# Patient Record
Sex: FEMALE | Race: WHITE | NOT HISPANIC OR LATINO | Employment: FULL TIME | URBAN - NONMETROPOLITAN AREA
[De-identification: names, ages, dates, MRNs, and addresses within clinical notes are randomized per-mention and may not be internally consistent; named-entity substitution may affect disease eponyms.]

---

## 2023-06-24 ENCOUNTER — TELEPHONE (OUTPATIENT)
Dept: PEDIATRICS | Facility: CLINIC | Age: 23
End: 2023-06-24

## 2023-06-24 ENCOUNTER — OFFICE VISIT (OUTPATIENT)
Dept: PEDIATRICS | Facility: CLINIC | Age: 23
End: 2023-06-24
Payer: COMMERCIAL

## 2023-06-24 VITALS
DIASTOLIC BLOOD PRESSURE: 65 MMHG | SYSTOLIC BLOOD PRESSURE: 129 MMHG | HEART RATE: 85 BPM | WEIGHT: 260.8 LBS | HEIGHT: 66 IN | OXYGEN SATURATION: 97 % | BODY MASS INDEX: 41.91 KG/M2

## 2023-06-24 DIAGNOSIS — J30.2 SEASONAL ALLERGIC RHINITIS, UNSPECIFIED TRIGGER: ICD-10-CM

## 2023-06-24 DIAGNOSIS — J45.901 ASTHMA WITH ACUTE EXACERBATION, UNSPECIFIED ASTHMA SEVERITY, UNSPECIFIED WHETHER PERSISTENT (HHS-HCC): ICD-10-CM

## 2023-06-24 DIAGNOSIS — J40 BRONCHITIS: ICD-10-CM

## 2023-06-24 DIAGNOSIS — E03.9 HYPOTHYROIDISM, UNSPECIFIED TYPE: Primary | ICD-10-CM

## 2023-06-24 PROBLEM — K29.00 GASTRITIS, ACUTE: Status: RESOLVED | Noted: 2023-06-24 | Resolved: 2023-06-24

## 2023-06-24 PROBLEM — R03.0 BLOOD PRESSURE ELEVATED WITHOUT HISTORY OF HTN: Status: RESOLVED | Noted: 2023-06-24 | Resolved: 2023-06-24

## 2023-06-24 PROBLEM — J02.9 PHARYNGITIS: Status: RESOLVED | Noted: 2023-06-24 | Resolved: 2023-06-24

## 2023-06-24 PROBLEM — E66.813 OBESITY, CLASS III, BMI 40-49.9 (MORBID OBESITY): Status: ACTIVE | Noted: 2023-06-24

## 2023-06-24 PROBLEM — E66.01 OBESITY, CLASS III, BMI 40-49.9 (MORBID OBESITY) (MULTI): Status: ACTIVE | Noted: 2023-06-24

## 2023-06-24 PROBLEM — M25.671 DECREASED RANGE OF MOTION OF RIGHT ANKLE: Status: RESOLVED | Noted: 2023-06-24 | Resolved: 2023-06-24

## 2023-06-24 PROBLEM — R03.0 BLOOD PRESSURE ELEVATED WITHOUT HISTORY OF HTN: Status: ACTIVE | Noted: 2023-06-24

## 2023-06-24 PROBLEM — K21.9 GERD (GASTROESOPHAGEAL REFLUX DISEASE): Status: RESOLVED | Noted: 2023-06-24 | Resolved: 2023-06-24

## 2023-06-24 PROBLEM — J30.9 ALLERGIC RHINITIS: Status: ACTIVE | Noted: 2023-06-24

## 2023-06-24 PROBLEM — E88.819 INSULIN RESISTANCE: Status: ACTIVE | Noted: 2023-06-24

## 2023-06-24 PROBLEM — R26.9 ALTERED GAIT: Status: ACTIVE | Noted: 2023-06-24

## 2023-06-24 PROBLEM — R63.5 ABNORMAL WEIGHT GAIN: Status: ACTIVE | Noted: 2023-06-24

## 2023-06-24 PROBLEM — L83 ACANTHOSIS NIGRICANS: Status: ACTIVE | Noted: 2023-06-24

## 2023-06-24 PROCEDURE — 99214 OFFICE O/P EST MOD 30 MIN: CPT | Performed by: PEDIATRICS

## 2023-06-24 PROCEDURE — 1036F TOBACCO NON-USER: CPT | Performed by: PEDIATRICS

## 2023-06-24 RX ORDER — METFORMIN HYDROCHLORIDE 500 MG/1
TABLET ORAL 2 TIMES DAILY
COMMUNITY
End: 2023-07-14 | Stop reason: SDUPTHER

## 2023-06-24 RX ORDER — FLUTICASONE PROPIONATE 110 UG/1
2 AEROSOL, METERED RESPIRATORY (INHALATION)
Qty: 12 G | Refills: 1 | Status: SHIPPED | OUTPATIENT
Start: 2023-06-24 | End: 2023-07-14 | Stop reason: WASHOUT

## 2023-06-24 RX ORDER — ALBUTEROL SULFATE 90 UG/1
6 AEROSOL, METERED RESPIRATORY (INHALATION) ONCE
Status: COMPLETED | OUTPATIENT
Start: 2023-06-24 | End: 2023-06-24

## 2023-06-24 RX ORDER — AZITHROMYCIN 250 MG/1
TABLET, FILM COATED ORAL
Qty: 6 TABLET | Refills: 0 | Status: SHIPPED | OUTPATIENT
Start: 2023-06-24 | End: 2023-06-29

## 2023-06-24 RX ORDER — ALBUTEROL SULFATE 90 UG/1
2 AEROSOL, METERED RESPIRATORY (INHALATION) EVERY 4 HOURS PRN
COMMUNITY
End: 2023-07-14 | Stop reason: SDUPTHER

## 2023-06-24 RX ORDER — MONTELUKAST SODIUM 10 MG/1
1 TABLET ORAL DAILY
COMMUNITY
Start: 2019-05-24 | End: 2023-07-14 | Stop reason: SDUPTHER

## 2023-06-24 RX ORDER — LEVOTHYROXINE SODIUM 100 UG/1
TABLET ORAL
COMMUNITY
Start: 2022-08-26 | End: 2023-07-14 | Stop reason: SDUPTHER

## 2023-06-24 RX ADMIN — ALBUTEROL SULFATE 6 PUFF: 90 AEROSOL, METERED RESPIRATORY (INHALATION) at 10:10

## 2023-06-24 ASSESSMENT — ENCOUNTER SYMPTOMS
WEIGHT LOSS: 0
HEARTBURN: 0
RHINORRHEA: 1
DYSPNEA ON EXERTION: 0
APPETITE CHANGE: 0
SPUTUM PRODUCTION: 1
SORE THROAT: 1
HEADACHES: 0
HOARSE VOICE: 1
COUGH: 1
FREQUENT THROAT CLEARING: 1
DIFFICULTY BREATHING: 1
FEVER: 0

## 2023-06-24 NOTE — ASSESSMENT & PLAN NOTE
Albuterol 4 puffs with spacer every 4-6 hours for the next 2-3 days. Flovent 110 2 puffs BID x 2-3 weeks.

## 2023-06-24 NOTE — PROGRESS NOTES
"Subjective   Patient ID: Tianna Costa is a 22 y.o. female who presents with Selffor Cough (Coughing up phlem/Hurts to breathe deep /Symptoms for the past 1-1.5 weeks).      Asthma  She complains of cough, difficulty breathing, frequent throat clearing, hoarse voice and sputum production. This is a recurrent problem. Episode onset: 1-2 weeks ago. The problem occurs intermittently. The problem has been waxing and waning. The cough is productive of sputum. Associated symptoms include nasal congestion, rhinorrhea, sneezing and a sore throat. Pertinent negatives include no appetite change, chest pain, dyspnea on exertion, ear congestion, ear pain, fever, headaches, heartburn or weight loss. Her symptoms are aggravated by URI, pollen and change in weather. Her symptoms are alleviated by beta-agonist. She reports minimal improvement on treatment. Her past medical history is significant for asthma and bronchitis.       Review of Systems   Constitutional:  Negative for appetite change, fever and weight loss.   HENT:  Positive for hoarse voice, rhinorrhea, sneezing and sore throat. Negative for ear pain.    Respiratory:  Positive for cough and sputum production.    Cardiovascular:  Negative for chest pain and dyspnea on exertion.   Gastrointestinal:  Negative for heartburn.   Neurological:  Negative for headaches.           Objective   /65   Pulse 85   Ht 1.676 m (5' 6\")   Wt 118 kg (260 lb 12.8 oz)   SpO2 97%   BMI 42.09 kg/m²   BSA: 2.34 meters squared  Growth percentiles: Facility age limit for growth %gregorio is 20 years. Facility age limit for growth %gregorio is 20 years.     Physical Exam  Vitals and nursing note reviewed.   Constitutional:       Appearance: Normal appearance. She is normal weight.   HENT:      Head: Normocephalic and atraumatic.      Right Ear: Tympanic membrane, ear canal and external ear normal.      Left Ear: Tympanic membrane, ear canal and external ear normal.      Nose: Nose normal.      " Mouth/Throat:      Mouth: Mucous membranes are moist.      Pharynx: Oropharynx is clear.   Eyes:      Extraocular Movements: Extraocular movements intact.      Conjunctiva/sclera: Conjunctivae normal.      Pupils: Pupils are equal, round, and reactive to light.   Cardiovascular:      Rate and Rhythm: Normal rate and regular rhythm.      Pulses: Normal pulses.      Heart sounds: Normal heart sounds.   Pulmonary:      Effort: Pulmonary effort is normal. Prolonged expiration present. No tachypnea or accessory muscle usage.      Breath sounds: Decreased air movement present.      Comments: AE/expiration Improved with 4 puffs of Albuterol MDI with spacer.   Harsh bronchial cough. End exp wheeze at bases    Abdominal:      General: Abdomen is flat. Bowel sounds are normal.      Palpations: Abdomen is soft.   Musculoskeletal:         General: Normal range of motion.      Cervical back: Normal range of motion and neck supple.   Skin:     General: Skin is warm and dry.      Capillary Refill: Capillary refill takes less than 2 seconds.   Neurological:      General: No focal deficit present.      Mental Status: She is alert and oriented to person, place, and time. Mental status is at baseline.   Psychiatric:         Mood and Affect: Mood normal.         Behavior: Behavior normal.         Thought Content: Thought content normal.         Judgment: Judgment normal.         Assessment/Plan   Problem List Items Addressed This Visit       Allergic rhinitis     Continue Flonase and Singulair. Definitely one contributing factor to asthma         Hypothyroidism - Primary     Followed by endo. Due for TSH/FT4 recheck         Relevant Orders    TSH    Thyroxine, Free    Asthma with acute exacerbation     Albuterol 4 puffs with spacer every 4-6 hours for the next 2-3 days. Flovent 110 2 puffs BID x 2-3 weeks.          Relevant Medications    inhalational spacing device spacer 1 each (Completed)    albuterol 90 mcg/actuation inhaler 6 puff  (Completed)    fluticasone (Flovent HFA) 110 mcg/actuation inhaler    Bronchitis     Zithromax Z-pack x 5 days         Relevant Medications    azithromycin (Zithromax) 250 mg tablet

## 2023-06-28 ENCOUNTER — LAB (OUTPATIENT)
Dept: LAB | Facility: LAB | Age: 23
End: 2023-06-28
Payer: COMMERCIAL

## 2023-06-28 DIAGNOSIS — E03.9 HYPOTHYROIDISM, UNSPECIFIED TYPE: ICD-10-CM

## 2023-06-28 LAB
THYROTROPIN (MIU/L) IN SER/PLAS BY DETECTION LIMIT <= 0.05 MIU/L: 2.33 MIU/L (ref 0.44–3.98)
THYROXINE (T4) FREE (NG/DL) IN SER/PLAS: 1.03 NG/DL (ref 0.61–1.12)

## 2023-06-28 PROCEDURE — 84439 ASSAY OF FREE THYROXINE: CPT

## 2023-06-28 PROCEDURE — 36415 COLL VENOUS BLD VENIPUNCTURE: CPT

## 2023-06-28 PROCEDURE — 84443 ASSAY THYROID STIM HORMONE: CPT

## 2023-06-29 NOTE — RESULT ENCOUNTER NOTE
Mike its Dr. Melton. Thyroid is looking good. Good luck with your new provider in July and I hope you are feeling better

## 2023-07-13 NOTE — PROGRESS NOTES
Subjective     HPI   Tianna Costa is a 22 y.o. year old female patient with presenting to clinic with concern for   Chief Complaint   Patient presents with    peds to adult     Move out of pediatric        Tianna is presenting to clinic as a new patient from pediatrics.  She has a history of asthma, hypothyroidism, insulin resistance.    PCOS eval?? Declined pelvic ultrasound.    Has never been sexually active. PAP usually starts at age 22. Recommend GYN referral d/t insulin resistance, possible PCOS?    Needs a1C    Patient Active Problem List   Diagnosis    Abnormal weight gain    Acanthosis nigricans    Allergic rhinitis    Altered gait    Hypothyroidism    Insulin resistance    Obesity, Class III, BMI 40-49.9 (morbid obesity) (CMS/[facility])    Asthma with acute exacerbation    Bronchitis       Past Medical History:   Diagnosis Date    Acute upper respiratory infection, unspecified 06/06/2022    Acute URI    Acute upper respiratory infection, unspecified 11/11/2019    Viral URI    Allergic     Asthma     Disease of thyroid gland     Encounter for routine child health examination with abnormal findings 07/29/2021    Encounter for well adolescent visit with abnormal findings    Encounter for routine child health examination with abnormal findings 05/24/2019    Encounter for routine child health examination with abnormal findings    Gastritis, acute 06/24/2023    GERD (gastroesophageal reflux disease)     Personal history of diseases of the skin and subcutaneous tissue 11/05/2016    History of contact dermatitis    Personal history of other diseases of the respiratory system     History of acute pharyngitis    Personal history of other diseases of the respiratory system 07/31/2019    History of viral pharyngitis    Personal history of other diseases of the respiratory system 05/24/2019    History of allergic rhinitis    Personal history of other drug therapy 11/05/2016    History of influenza vaccination    Personal  history of other endocrine, nutritional and metabolic disease     History of hypothyroidism    Personal history of other specified conditions 12/10/2020    History of wheezing    Pharyngitis 06/24/2023      Past Surgical History:   Procedure Laterality Date    FRACTURE SURGERY  November 2021      Family History   Problem Relation Name Age of Onset    Asthma Mother Lisa Costa     Arthritis Father Ranjan Costa     Diabetes Father Ranjan Costa     Asthma Sister Lisa Costa     Diabetes Sister Cha Costa       Social History     Tobacco Use    Smoking status: Never     Passive exposure: Never    Smokeless tobacco: Never   Substance Use Topics    Alcohol use: Yes     Alcohol/week: 1.0 standard drink of alcohol     Types: 1 Standard drinks or equivalent per week     Comment: I drink very little, and mostly at special occasions        Current Outpatient Medications:     levothyroxine (Synthroid, Levoxyl) 100 mcg tablet, Take by mouth., Disp: , Rfl:     metFORMIN (Glucophage) 500 mg tablet, Take by mouth twice a day., Disp: , Rfl:     montelukast (Singulair) 10 mg tablet, Take 1 tablet (10 mg) by mouth once daily., Disp: , Rfl:     albuterol 90 mcg/actuation inhaler, Inhale 2 puffs every 4 hours if needed for wheezing or shortness of breath., Disp: , Rfl:     fluticasone (Flovent HFA) 110 mcg/actuation inhaler, Inhale 2 puffs 2 times a day. Rinse mouth with water after use to reduce aftertaste and incidence of candidiasis. Do not swallow. (Patient not taking: Reported on 7/14/2023), Disp: 12 g, Rfl: 1     Review of Systems  Review of Systems:   Constitutional: Denies fever  HEENT: Denies ST, earache  CVS: Denies Chest pain  Pulmonary: Denies wheezing, SOB  GI: Denies N/V  : Denies dysuria  Musculoskeletal:  Denies myalgia  Neuro: Denies focal weakness or numbness.  Skin: Denies Rashes.  *Review of Systems is negative unless otherwise mentioned in HPI or ROS above.    Objective   /72   Pulse 97    "Temp 36.8 °C (98.2 °F)   Ht 1.676 m (5' 6\")   Wt 118 kg (259 lb 9.6 oz)   SpO2 98%   BMI 41.90 kg/m²     Physical Exam  Physical exam:  /72   Pulse 97   Temp 36.8 °C (98.2 °F)   Ht 1.676 m (5' 6\")   Wt 118 kg (259 lb 9.6 oz)   SpO2 98%   BMI 41.90 kg/m²  reviewed   Body mass index is 41.9 kg/m².   Constitutional: NAD.  Resting comfortably.  Head: Atraumatic, normocephalic.  EENT: deferred d/t masking  Cardiac: Regular rate & rhythm.   Pulmonary: Lungs clear bilat  GI: Soft, Nontender, nondistended.   Musculoskeletal: No peripheral edema.   Skin: No evidence of trauma. No rashes  Psych: Intact judgement and insight.    .Assessment/Plan   Problem List Items Addressed This Visit       Hypothyroidism    Relevant Medications    levothyroxine (Synthroid, Levoxyl) 100 mcg tablet    Insulin resistance    Relevant Medications    metFORMIN (Glucophage) 500 mg tablet     Other Visit Diagnoses       Irregular periods    -  Primary    Relevant Medications    L norgest/e.estradioL-e.estrad (Seasonique) 0.15 mg-30 mcg (84)/10 mcg (7) tablets,dose pack,3 month tablet    Other Relevant Orders    Referral to Gynecology    Mild intermittent asthma without complication        Relevant Medications    montelukast (Singulair) 10 mg tablet    albuterol 90 mcg/actuation inhaler            "

## 2023-07-14 ENCOUNTER — OFFICE VISIT (OUTPATIENT)
Dept: PRIMARY CARE | Facility: CLINIC | Age: 23
End: 2023-07-14
Payer: COMMERCIAL

## 2023-07-14 VITALS
HEIGHT: 66 IN | HEART RATE: 97 BPM | BODY MASS INDEX: 41.72 KG/M2 | OXYGEN SATURATION: 98 % | DIASTOLIC BLOOD PRESSURE: 72 MMHG | SYSTOLIC BLOOD PRESSURE: 118 MMHG | WEIGHT: 259.6 LBS | TEMPERATURE: 98.2 F

## 2023-07-14 DIAGNOSIS — E03.9 HYPOTHYROIDISM, UNSPECIFIED TYPE: ICD-10-CM

## 2023-07-14 DIAGNOSIS — J45.20 MILD INTERMITTENT ASTHMA WITHOUT COMPLICATION (HHS-HCC): ICD-10-CM

## 2023-07-14 DIAGNOSIS — N92.6 IRREGULAR PERIODS: Primary | ICD-10-CM

## 2023-07-14 DIAGNOSIS — E88.819 INSULIN RESISTANCE: ICD-10-CM

## 2023-07-14 PROCEDURE — 99204 OFFICE O/P NEW MOD 45 MIN: CPT | Performed by: PHYSICIAN ASSISTANT

## 2023-07-14 PROCEDURE — 1036F TOBACCO NON-USER: CPT | Performed by: PHYSICIAN ASSISTANT

## 2023-07-14 RX ORDER — LEVOTHYROXINE SODIUM 100 UG/1
100 TABLET ORAL
Qty: 90 TABLET | Refills: 1 | Status: SHIPPED | OUTPATIENT
Start: 2023-07-14 | End: 2024-02-19 | Stop reason: SDUPTHER

## 2023-07-14 RX ORDER — LEVONORGESTREL / ETHINYL ESTRADIOL AND ETHINYL ESTRADIOL 150-30(84)
1 KIT ORAL DAILY
Qty: 91 TABLET | Refills: 3 | Status: SHIPPED | OUTPATIENT
Start: 2023-07-14 | End: 2024-07-13

## 2023-07-14 RX ORDER — METFORMIN HYDROCHLORIDE 500 MG/1
500 TABLET ORAL
Qty: 90 TABLET | Refills: 1 | Status: SHIPPED | OUTPATIENT
Start: 2023-07-14 | End: 2024-04-02 | Stop reason: RX

## 2023-07-14 RX ORDER — CETIRIZINE HYDROCHLORIDE 10 MG/1
10 TABLET ORAL DAILY
COMMUNITY

## 2023-07-14 RX ORDER — ALBUTEROL SULFATE 90 UG/1
2 AEROSOL, METERED RESPIRATORY (INHALATION) EVERY 6 HOURS PRN
Qty: 18 G | Refills: 2 | Status: SHIPPED | OUTPATIENT
Start: 2023-07-14 | End: 2024-04-02

## 2023-07-14 RX ORDER — MONTELUKAST SODIUM 10 MG/1
10 TABLET ORAL NIGHTLY
Qty: 90 TABLET | Refills: 1 | Status: SHIPPED | OUTPATIENT
Start: 2023-07-14 | End: 2023-10-20 | Stop reason: WASHOUT

## 2023-07-14 ASSESSMENT — ENCOUNTER SYMPTOMS
DEPRESSION: 0
OCCASIONAL FEELINGS OF UNSTEADINESS: 0
LOSS OF SENSATION IN FEET: 0

## 2023-07-14 ASSESSMENT — PATIENT HEALTH QUESTIONNAIRE - PHQ9
1. LITTLE INTEREST OR PLEASURE IN DOING THINGS: NOT AT ALL
SUM OF ALL RESPONSES TO PHQ9 QUESTIONS 1 AND 2: 0
2. FEELING DOWN, DEPRESSED OR HOPELESS: NOT AT ALL

## 2023-09-26 ENCOUNTER — TELEMEDICINE (OUTPATIENT)
Dept: PRIMARY CARE | Facility: CLINIC | Age: 23
End: 2023-09-26
Payer: COMMERCIAL

## 2023-09-26 DIAGNOSIS — J40 BRONCHITIS: Primary | ICD-10-CM

## 2023-09-26 PROCEDURE — 99213 OFFICE O/P EST LOW 20 MIN: CPT | Performed by: FAMILY MEDICINE

## 2023-09-26 RX ORDER — AZITHROMYCIN 250 MG/1
TABLET, FILM COATED ORAL
Qty: 6 TABLET | Refills: 0 | Status: SHIPPED | OUTPATIENT
Start: 2023-09-26 | End: 2023-10-20 | Stop reason: ALTCHOICE

## 2023-09-26 ASSESSMENT — ENCOUNTER SYMPTOMS
STRIDOR: 0
SINUS PRESSURE: 1
COUGH: 1
SORE THROAT: 1
SHORTNESS OF BREATH: 0
WHEEZING: 0

## 2023-09-26 NOTE — PROGRESS NOTES
Subjective   Patient ID: Tianna Costa is a 22 y.o. female who presents for No chief complaint on file..    Virtual or Telephone Consent    An interactive audio and video telecommunication system which permits real time communications between the patient (at the originating site) and provider (at the distant site) was utilized to provide this telehealth service.   Verbal consent was requested and obtained from Tianna Costa on this date, 09/26/23 for a telehealth visit.       Pt presents with a 5 day history of sinus pressure, congestion, ear pain, sore throat  No fever  She has been taking Advil Cold and Sinus  She has a history of asthma and a history of bronchitis  She used her inhaler once on Friday.    Med Allergies reviewed: Sulfa   Photo id verified         Review of Systems   HENT:  Positive for congestion, sinus pressure and sore throat.    Respiratory:  Positive for cough. Negative for shortness of breath, wheezing and stridor.        Objective   There were no vitals taken for this visit.    Physical Exam  Constitutional:       Appearance: Normal appearance.   HENT:      Nose: Congestion present.   Neurological:      Mental Status: She is alert.         Virtual Visit Duration: 10 min      Assessment/Plan   Diagnoses and all orders for this visit:  Bronchitis  -     azithromycin (Zithromax) 250 mg tablet; Take 2 tabs (500 mg) by mouth today, than 1 daily for 4 days.       Recc she continue with supportive care, rest, fluids, Advil Cold and Sinus  I counseled patient regarding the risks, benefits and side effects of antibiotics.  I advised her to call with any new or worsening symptoms    Dinah TIMA Stokes,

## 2023-10-20 ENCOUNTER — TELEMEDICINE (OUTPATIENT)
Dept: PRIMARY CARE | Facility: CLINIC | Age: 23
End: 2023-10-20
Payer: COMMERCIAL

## 2023-10-20 DIAGNOSIS — G44.209 TENSION HEADACHE: Primary | ICD-10-CM

## 2023-10-20 PROCEDURE — 99214 OFFICE O/P EST MOD 30 MIN: CPT | Performed by: PHYSICIAN ASSISTANT

## 2023-10-20 RX ORDER — METHYLPREDNISOLONE 4 MG/1
TABLET ORAL
Qty: 21 TABLET | Refills: 0 | Status: SHIPPED | OUTPATIENT
Start: 2023-10-20 | End: 2023-10-27

## 2023-10-20 RX ORDER — CYCLOBENZAPRINE HCL 10 MG
10 TABLET ORAL 3 TIMES DAILY PRN
Qty: 30 TABLET | Refills: 0 | Status: SHIPPED | OUTPATIENT
Start: 2023-10-20 | End: 2024-06-05 | Stop reason: WASHOUT

## 2023-10-20 NOTE — PATIENT INSTRUCTIONS
Tension headache    -  Primary    Relevant Medications    cyclobenzaprine (Flexeril) 10 mg tablet    methylPREDNISolone (Medrol Dospak) 4 mg tablets           Continue 600mg ibuprofen regularly for 3 days also to break cycle of inflammation and pain. Keep regular sleep and hydration schedule and rest. Warm compresses on neck  and gently stretching.

## 2023-10-20 NOTE — PROGRESS NOTES
An interactive audio and video telecommunication system which permits real time communications between the patient (at the originating site) and provider (at the distant site) was utilized to provide this telehealth service.   Verbal consent was requested and obtained from Tianna Costa on this date, 10/20/23 for a telehealth visit.     Subjective    Tianna Costa is a 22 y.o. year old female patient presenting for virtual visit   Chief Complaint   Patient presents with    Headache    Recurrent headaches from forehead bilat- feels deep into head  to parietal bilat  occiput bilat. No hx migraines. No focal deficits. NKI.   Daily headaches lasting a few hours, usually relieved by ibuprofen.600mg each.     In school at [employer], she has been under stress lately.     Patient Active Problem List   Diagnosis    Abnormal weight gain    Acanthosis nigricans    Allergic rhinitis    Altered gait    Hypothyroidism    Insulin resistance    Obesity, Class III, BMI 40-49.9 (morbid obesity) (CMS/[facility])    Asthma with acute exacerbation    Bronchitis       Past Medical History:   Diagnosis Date    Acute upper respiratory infection, unspecified 06/06/2022    Acute URI    Acute upper respiratory infection, unspecified 11/11/2019    Viral URI    Allergic     Asthma     Disease of thyroid gland     Encounter for routine child health examination with abnormal findings 07/29/2021    Encounter for well adolescent visit with abnormal findings    Encounter for routine child health examination with abnormal findings 05/24/2019    Encounter for routine child health examination with abnormal findings    Gastritis, acute 06/24/2023    GERD (gastroesophageal reflux disease)     Personal history of diseases of the skin and subcutaneous tissue 11/05/2016    History of contact dermatitis    Personal history of other diseases of the respiratory system     History of acute pharyngitis    Personal history of other diseases of the respiratory system  07/31/2019    History of viral pharyngitis    Personal history of other diseases of the respiratory system 05/24/2019    History of allergic rhinitis    Personal history of other drug therapy 11/05/2016    History of influenza vaccination    Personal history of other endocrine, nutritional and metabolic disease     History of hypothyroidism    Personal history of other specified conditions 12/10/2020    History of wheezing    Pharyngitis 06/24/2023      Past Surgical History:   Procedure Laterality Date    FRACTURE SURGERY  November 2021      Family History   Problem Relation Name Age of Onset    Asthma Mother Lisa Costa     Arthritis Father Ranjan Costa     Diabetes Father Ranjan Costa     Asthma Sister Lisa Costa     Diabetes Sister Cha Costa       Social History     Tobacco Use    Smoking status: Never     Passive exposure: Never    Smokeless tobacco: Never   Substance Use Topics    Alcohol use: Yes     Alcohol/week: 1.0 standard drink of alcohol     Types: 1 Standard drinks or equivalent per week     Comment: I drink very little, and mostly at special occasions        Current Outpatient Medications:     albuterol 90 mcg/actuation inhaler, Inhale 2 puffs every 6 hours if needed for wheezing or shortness of breath., Disp: 18 g, Rfl: 2    cetirizine (ZyrTEC) 10 mg tablet, Take 1 tablet (10 mg) by mouth once daily., Disp: , Rfl:     cyclobenzaprine (Flexeril) 10 mg tablet, Take 1 tablet (10 mg) by mouth 3 times a day as needed for muscle spasms., Disp: 30 tablet, Rfl: 0    L norgest/e.estradioL-e.estrad (Seasonique) 0.15 mg-30 mcg (84)/10 mcg (7) tablets,dose pack,3 month tablet, Take 1 tablet by mouth once daily., Disp: 91 tablet, Rfl: 3    levothyroxine (Synthroid, Levoxyl) 100 mcg tablet, Take 1 tablet (100 mcg) by mouth once daily in the morning. Take before meals., Disp: 90 tablet, Rfl: 1    metFORMIN (Glucophage) 500 mg tablet, Take 1 tablet (500 mg) by mouth once daily with a meal., Disp:  90 tablet, Rfl: 1    methylPREDNISolone (Medrol Dospak) 4 mg tablets, Take as directed on package., Disp: 21 tablet, Rfl: 0     Review of Systems    Review of Systems:   Constitutional: Denies fever  HEENT: Denies ST, earache  CVS: Denies Chest pain  Pulmonary: Denies wheezing, SOB  GI: Denies N/V  : Denies dysuria  Musculoskeletal:  Denies myalgia  Neuro: Denies focal weakness or numbness.  Skin: Denies Rashes.  *Review of Systems is negative unless otherwise mentioned in HPI or ROS above.     Objective    VITALS  Pt does not have vitals available at time of visit.    Exam    Unable to perform physical exam in virtual platform    Assessment/Plan    Problem List Items Addressed This Visit    None  Visit Diagnoses       Tension headache    -  Primary    Relevant Medications    cyclobenzaprine (Flexeril) 10 mg tablet    methylPREDNISolone (Medrol Dospak) 4 mg tablets           Continue 600mg ibuprofen regularly for 3 days also to break cycle of inflammation and pain. Keep regular sleep and hydration schedule and rest. Warm compresses on neck  and gently stretching.       DISCHARGE    Please schedule a follow up visit     Any prescriptions were sent to the pharmacy, please make sure to pick them up and call if there are any issues or questions.     Thank you for trusting me to be a part of your healthcare.    Take care!     Dinah Mercado PA-C  [facility]  [phone]     Please feel free to call the office, or return a message if you have any questions or concerns.

## 2023-12-19 ENCOUNTER — OFFICE VISIT (OUTPATIENT)
Dept: PRIMARY CARE | Facility: CLINIC | Age: 23
End: 2023-12-19
Payer: COMMERCIAL

## 2023-12-19 VITALS
HEART RATE: 114 BPM | HEIGHT: 66 IN | TEMPERATURE: 98.6 F | OXYGEN SATURATION: 98 % | BODY MASS INDEX: 43.07 KG/M2 | DIASTOLIC BLOOD PRESSURE: 60 MMHG | WEIGHT: 268 LBS | SYSTOLIC BLOOD PRESSURE: 130 MMHG

## 2023-12-19 DIAGNOSIS — E88.819 INSULIN RESISTANCE: ICD-10-CM

## 2023-12-19 DIAGNOSIS — E66.01 MORBID OBESITY WITH BMI OF 40.0-44.9, ADULT (MULTI): Primary | ICD-10-CM

## 2023-12-19 DIAGNOSIS — F95.9 TIC DISORDER: ICD-10-CM

## 2023-12-19 DIAGNOSIS — N92.6 IRREGULAR PERIODS: ICD-10-CM

## 2023-12-19 PROCEDURE — 1036F TOBACCO NON-USER: CPT | Performed by: PHYSICIAN ASSISTANT

## 2023-12-19 PROCEDURE — 99214 OFFICE O/P EST MOD 30 MIN: CPT | Performed by: PHYSICIAN ASSISTANT

## 2023-12-19 PROCEDURE — 3008F BODY MASS INDEX DOCD: CPT | Performed by: PHYSICIAN ASSISTANT

## 2023-12-19 RX ORDER — ESCITALOPRAM OXALATE 10 MG/1
10 TABLET ORAL DAILY
Qty: 90 TABLET | Refills: 1 | Status: SHIPPED | OUTPATIENT
Start: 2023-12-19 | End: 2024-06-05 | Stop reason: SDUPTHER

## 2023-12-19 RX ORDER — SEMAGLUTIDE 0.25 MG/.5ML
0.25 INJECTION, SOLUTION SUBCUTANEOUS
Qty: 2 ML | Refills: 1 | Status: SHIPPED | OUTPATIENT
Start: 2023-12-19 | End: 2024-06-05 | Stop reason: WASHOUT

## 2023-12-19 RX ORDER — LEVONORGESTREL / ETHINYL ESTRADIOL AND ETHINYL ESTRADIOL 150-30(84)
1 KIT ORAL DAILY
Qty: 91 TABLET | Refills: 3 | Status: CANCELLED | OUTPATIENT
Start: 2023-12-19 | End: 2024-12-18

## 2023-12-19 ASSESSMENT — PATIENT HEALTH QUESTIONNAIRE - PHQ9
1. LITTLE INTEREST OR PLEASURE IN DOING THINGS: NOT AT ALL
2. FEELING DOWN, DEPRESSED OR HOPELESS: NOT AT ALL
SUM OF ALL RESPONSES TO PHQ9 QUESTIONS 1 AND 2: 0

## 2023-12-19 NOTE — PROGRESS NOTES
Subjective     HPI   Tianna Costa is a 23 y.o. year old female patient with presenting to clinic with concern for   Chief Complaint   Patient presents with    Follow-up     Would like to start a weight loss medication.          Tianna has a history of asthma, hypothyroidism, insulin resistance.  She is interested in Wegovy. Her mother uses this medication and it is well covered by her insurance. She has been trying to be more physically active and has been eating better for the past few months without significant success. She has been off and on diets over her entire lifetime and is frustrated by her lack of progress. She is concerned about potential health complications.      Has never been sexually active. PAP usually starts at age 22. Recommend GYN referral d/t insulin resistance, possible PCOS. Declined pelvic ultrasound.  She tried metformin- unable to tolerate bid. Still unable to tolerate every day comfortably. 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    Facial tics 2/2 tourettes have been worsened during final exams and her teaching exam. She is interested in trying medication, but wants to avoid anything that might make her more tired          Patient Active Problem List   Diagnosis    Abnormal weight gain    Acanthosis nigricans    Allergic rhinitis    Altered gait    Hypothyroidism    Insulin resistance    Obesity, Class III, BMI 40-49.9 (morbid obesity) (CMS/[facility])    Asthma with acute exacerbation    Bronchitis       Past Medical History:   Diagnosis Date    Acute upper respiratory infection, unspecified 06/06/2022    Acute URI    Acute upper respiratory infection, unspecified 11/11/2019    Viral URI    Allergic     Asthma     Disease of thyroid gland     Encounter for routine child health examination with abnormal findings 07/29/2021    Encounter for well adolescent visit with abnormal findings    Encounter for routine child health examination with abnormal findings 05/24/2019    Encounter for routine  child health examination with abnormal findings    Gastritis, acute 06/24/2023    GERD (gastroesophageal reflux disease)     Personal history of diseases of the skin and subcutaneous tissue 11/05/2016    History of contact dermatitis    Personal history of other diseases of the respiratory system     History of acute pharyngitis    Personal history of other diseases of the respiratory system 07/31/2019    History of viral pharyngitis    Personal history of other diseases of the respiratory system 05/24/2019    History of allergic rhinitis    Personal history of other drug therapy 11/05/2016    History of influenza vaccination    Personal history of other endocrine, nutritional and metabolic disease     History of hypothyroidism    Personal history of other specified conditions 12/10/2020    History of wheezing    Pharyngitis 06/24/2023      Past Surgical History:   Procedure Laterality Date    FRACTURE SURGERY  November 2021      Family History   Problem Relation Name Age of Onset    Asthma Mother Lisa Costa     Arthritis Father Ranjan Costa     Diabetes Father Ranjan Costa     Asthma Sister Lisa Costa     Diabetes Sister Cha Costa       Social History     Tobacco Use    Smoking status: Never     Passive exposure: Never    Smokeless tobacco: Never   Substance Use Topics    Alcohol use: Yes     Alcohol/week: 1.0 standard drink of alcohol     Types: 1 Standard drinks or equivalent per week     Comment: I drink very little, and mostly at special occasions        Current Outpatient Medications:     albuterol 90 mcg/actuation inhaler, Inhale 2 puffs every 6 hours if needed for wheezing or shortness of breath., Disp: 18 g, Rfl: 2    cetirizine (ZyrTEC) 10 mg tablet, Take 1 tablet (10 mg) by mouth once daily., Disp: , Rfl:     L norgest/e.estradioL-e.estrad (Seasonique) 0.15 mg-30 mcg (84)/10 mcg (7) tablets,dose pack,3 month tablet, Take 1 tablet by mouth once daily., Disp: 91 tablet, Rfl: 3     "levothyroxine (Synthroid, Levoxyl) 100 mcg tablet, Take 1 tablet (100 mcg) by mouth once daily in the morning. Take before meals., Disp: 90 tablet, Rfl: 1    metFORMIN (Glucophage) 500 mg tablet, Take 1 tablet (500 mg) by mouth once daily with a meal., Disp: 90 tablet, Rfl: 1    cyclobenzaprine (Flexeril) 10 mg tablet, Take 1 tablet (10 mg) by mouth 3 times a day as needed for muscle spasms. (Patient not taking: Reported on 12/19/2023), Disp: 30 tablet, Rfl: 0     Review of Systems  Constitutional: Denies fever  HEENT: Denies ST, earache  CVS: Denies Chest pain  Pulmonary: Denies wheezing, SOB  GI: Denies N/V  : Denies dysuria  Musculoskeletal:  Denies myalgia  Neuro: Denies focal weakness or numbness.  Skin: Denies Rashes.  *Review of Systems is negative unless otherwise mentioned in HPI or ROS above.    Objective   /60   Pulse (!) 114   Temp 37 °C (98.6 °F)   Ht 1.676 m (5' 6\")   Wt 122 kg (268 lb)   SpO2 98%   BMI 43.26 kg/m²  reviewed Body mass index is 43.26 kg/m².     Physical Exam  Constitutional: NAD.  Obese Resting comfortably.  Head: Atraumatic, normocephalic.  ENT: Moist oral mucosa. Nasal mucosa wnl.   Cardiac: Regular rate ~90 & rhythm.   Pulmonary: Lungs clear bilat  GI: Soft, Nontender, nondistended.   Musculoskeletal: No peripheral edema.   Skin: No evidence of trauma. No rashes  Psych: Intact judgement and insight.  Neuro: intact. Normal ambulation. Intermittent facial tics noted, one sided usually upper face near eye, but occasionally involving mouth laterally.    .Assessment/Plan   Problem List Items Addressed This Visit             ICD-10-CM    Insulin resistance E88.819     Other Visit Diagnoses         Codes    Morbid obesity with BMI of 40.0-44.9, adult (CMS/[facility])    -  Primary E66.01, Z68.41    Relevant Medications    semaglutide, weight loss, (Wegovy) 0.25 mg/0.5 mL pen injector    Irregular periods     N92.6    Tic disorder     F95.9    Relevant Medications    escitalopram " (Lexapro) 10 mg tablet

## 2024-01-30 ENCOUNTER — APPOINTMENT (OUTPATIENT)
Dept: PRIMARY CARE | Facility: CLINIC | Age: 24
End: 2024-01-30
Payer: COMMERCIAL

## 2024-02-19 ENCOUNTER — TELEMEDICINE (OUTPATIENT)
Dept: PRIMARY CARE | Facility: CLINIC | Age: 24
End: 2024-02-19
Payer: COMMERCIAL

## 2024-02-19 DIAGNOSIS — F95.9 TIC DISORDER: ICD-10-CM

## 2024-02-19 DIAGNOSIS — E03.9 HYPOTHYROIDISM, UNSPECIFIED TYPE: ICD-10-CM

## 2024-02-19 DIAGNOSIS — E66.01 MORBID OBESITY WITH BMI OF 40.0-44.9, ADULT (MULTI): Primary | ICD-10-CM

## 2024-02-19 PROCEDURE — 3008F BODY MASS INDEX DOCD: CPT | Performed by: PHYSICIAN ASSISTANT

## 2024-02-19 PROCEDURE — 99213 OFFICE O/P EST LOW 20 MIN: CPT | Performed by: PHYSICIAN ASSISTANT

## 2024-02-19 PROCEDURE — 1036F TOBACCO NON-USER: CPT | Performed by: PHYSICIAN ASSISTANT

## 2024-02-19 RX ORDER — LEVOTHYROXINE SODIUM 100 UG/1
100 TABLET ORAL
Qty: 90 TABLET | Refills: 1 | Status: SHIPPED | OUTPATIENT
Start: 2024-02-19 | End: 2024-08-17

## 2024-02-19 NOTE — PROGRESS NOTES
An interactive audio and video telecommunication system which permits real time communications between the patient (at the originating site) and provider (at the distant site) was utilized to provide this telehealth service.   Verbal consent was requested and obtained from Tianna Costa on this date, 03/01/24 for a telehealth visit.     Subjective    Tianna Costa is a 23 y.o. year old female patient presenting for virtual visit   Chief Complaint   Patient presents with    Weight Check     Obesity      Subjective     HPI   Tianna Costa is a 23 y.o. year old female patient with presenting to clinic with concern for   Chief Complaint   Patient presents with    Weight Check     Obesity       Unable to access wegovy- unavailable at pharmacy. Tianna says that her mom found coupon card for zepbound and it is easier to get at local stores. Requests change. Discussed options of moving current Rx to alternative pharmacy as well that may be more liekly to carry it (ie: in another neighborhood) or a different pharmacy chain. Insurance did not cover zepbound.    Accepted at [facility]. Requests work excuse from lifting in humid heat at [employer] this summer. Will send papers. I am in agreement for her d/t wheezing issues. Currentyl she is recovering from bronchitis. Just finished steroids andis still on augmentin for sinusitis.  Tianna has a history of asthma, hypothyroidism, insulin resistance.  She is interested in Wegovy. Her mother uses this medication and it is well covered by her insurance. She has been trying to be more physically active and has been eating better for the past few months without significant success. She has been off and on diets over her entire lifetime and is frustrated by her lack of progress. She is concerned about potential health complications.        Patient Active Problem List   Diagnosis    Abnormal weight gain    Acanthosis nigricans    Allergic rhinitis    Altered gait     Hypothyroidism    Insulin resistance    Obesity, Class III, BMI 40-49.9 (morbid obesity) (CMS/[facility])    Asthma with acute exacerbation    Bronchitis       Past Medical History:   Diagnosis Date    Acute upper respiratory infection, unspecified 06/06/2022    Acute URI    Acute upper respiratory infection, unspecified 11/11/2019    Viral URI    Allergic     Asthma     Disease of thyroid gland     Encounter for routine child health examination with abnormal findings 07/29/2021    Encounter for well adolescent visit with abnormal findings    Encounter for routine child health examination with abnormal findings 05/24/2019    Encounter for routine child health examination with abnormal findings    Gastritis, acute 06/24/2023    GERD (gastroesophageal reflux disease)     Personal history of diseases of the skin and subcutaneous tissue 11/05/2016    History of contact dermatitis    Personal history of other diseases of the respiratory system     History of acute pharyngitis    Personal history of other diseases of the respiratory system 07/31/2019    History of viral pharyngitis    Personal history of other diseases of the respiratory system 05/24/2019    History of allergic rhinitis    Personal history of other drug therapy 11/05/2016    History of influenza vaccination    Personal history of other endocrine, nutritional and metabolic disease     History of hypothyroidism    Personal history of other specified conditions 12/10/2020    History of wheezing    Pharyngitis 06/24/2023      Past Surgical History:   Procedure Laterality Date    FRACTURE SURGERY  November 2021      Family History   Problem Relation Name Age of Onset    Asthma Mother Lisa Julissa     Arthritis Father Ranjan Costa     Diabetes Father Ranjan Pallmignon     Asthma Sister Lisa Julissa     Diabetes Sister Cha Tavaresmignon       Social History     Tobacco Use    Smoking status: Never     Passive exposure: Never    Smokeless tobacco: Never   Substance  Use Topics    Alcohol use: Yes     Alcohol/week: 1.0 standard drink of alcohol     Types: 1 Standard drinks or equivalent per week     Comment: I drink very little, and mostly at special occasions        Current Outpatient Medications:     albuterol 90 mcg/actuation inhaler, Inhale 2 puffs every 6 hours if needed for wheezing or shortness of breath., Disp: 18 g, Rfl: 2    cetirizine (ZyrTEC) 10 mg tablet, Take 1 tablet (10 mg) by mouth once daily., Disp: , Rfl:     cyclobenzaprine (Flexeril) 10 mg tablet, Take 1 tablet (10 mg) by mouth 3 times a day as needed for muscle spasms. (Patient not taking: Reported on 12/19/2023), Disp: 30 tablet, Rfl: 0    escitalopram (Lexapro) 10 mg tablet, Take 1 tablet (10 mg) by mouth once daily., Disp: 90 tablet, Rfl: 1    L norgest/e.estradioL-e.estrad (Seasonique) 0.15 mg-30 mcg (84)/10 mcg (7) tablets,dose pack,3 month tablet, Take 1 tablet by mouth once daily., Disp: 91 tablet, Rfl: 3    levothyroxine (Synthroid, Levoxyl) 100 mcg tablet, Take 1 tablet (100 mcg) by mouth once daily in the morning. Take before meals., Disp: 90 tablet, Rfl: 1    metFORMIN (Glucophage) 500 mg tablet, Take 1 tablet (500 mg) by mouth once daily with a meal., Disp: 90 tablet, Rfl: 1    semaglutide, weight loss, (Wegovy) 0.25 mg/0.5 mL pen injector, Inject 0.25 mg under the skin 1 (one) time per week., Disp: 2 mL, Rfl: 1     Review of Systems    Review of Systems:   Constitutional: Denies fever  HEENT: Denies ST, earache  CVS: Denies Chest pain  Pulmonary: Denies wheezing, SOB  GI: Denies N/V  : Denies dysuria  Musculoskeletal:  Denies myalgia  Neuro: Denies focal weakness or numbness.  Skin: Denies Rashes.  *Review of Systems is negative unless otherwise mentioned in HPI or ROS above.     Objective    VITALS  Pt does not have vitals available at time of visit.    Exam       Limited physical exam in virtual platform  Nontoxic. No acute distress.  Nonlabored breathing.    Assessment/Plan      Problem  List Items Addressed This Visit       Hypothyroidism    Relevant Medications    levothyroxine (Synthroid, Levoxyl) 100 mcg tablet     Other Visit Diagnoses       Morbid obesity with BMI of 40.0-44.9, adult (CMS/[facility])    -  Primary    Tic disorder        Relevant Orders    Referral to Neurology                   DISCHARGE    Please schedule a follow up visit     Any prescriptions were sent to the pharmacy, please make sure to pick them up and call if there are any issues or questions.     Thank you for trusting me to be a part of your healthcare.    Take care!     Dinah Mercado PA-C  [facility]  [phone]     Please feel free to call the office, or return a message if you have any questions or concerns.

## 2024-02-21 ENCOUNTER — TELEPHONE (OUTPATIENT)
Dept: PRIMARY CARE | Facility: CLINIC | Age: 24
End: 2024-02-21
Payer: COMMERCIAL

## 2024-02-21 NOTE — TELEPHONE ENCOUNTER
----- Message from Dinah Mercado PA-C sent at 2/20/2024  6:52 PM EST -----  Thanks Bridget.  No, I'll cancel zepbound. Her wegovy was covered, she's just having significant difficulty finding/accessing it. I was hoping zepbound would also be covered.  Dinah  ----- Message -----  From: Bridget Lechuga MA  Sent: 2/20/2024   2:45 PM EST  To: Dinah Mercado PA-C    I can do an appeal, if you feel it is medically needed.

## 2024-02-21 NOTE — TELEPHONE ENCOUNTER
Can do. I'll let the patient know.   Melolabial Transposition Flap Text: The defect edges were debeveled with a #15 scalpel blade. Given the location of the defect and the proximity to free margins a melolabial flap was deemed most appropriate. Using a sterile surgical marker, an appropriate melolabial transposition flap was drawn incorporating the defect. The area thus outlined was incised deep to adipose tissue with a #15 scalpel blade. The skin margins were undermined to an appropriate distance in all directions utilizing iris scissors. Following this, the designed flap was carried over into the primary defect and sutured into place.

## 2024-03-28 ENCOUNTER — APPOINTMENT (OUTPATIENT)
Dept: PRIMARY CARE | Facility: CLINIC | Age: 24
End: 2024-03-28
Payer: COMMERCIAL

## 2024-04-01 NOTE — PROGRESS NOTES
Subjective     HPI   Tianna Costa is a 23 y.o. year old female patient with presenting to clinic with concern for   Chief Complaint   Patient presents with    Follow-up     3 mth.          Wegovy is covered by insurance, but she is unable to access this medication.     Interested in trying topamax for weight loss. Discussed side effects. Continue OCP.   Could consider adding wellbutrin at next visit also. Topamax may help her tourette's tics also.     Accepted at [facility]. Requests work excuse from lifting in humid heat at [employer] this summer. Sent papers. [place]. I am in agreement for her d/t wheezing issues.     Tianna has a history of asthma, hypothyroidism, insulin resistance.         Patient Active Problem List   Diagnosis    Abnormal weight gain    Acanthosis nigricans    Allergic rhinitis    Altered gait    Hypothyroidism    Insulin resistance    Obesity, Class III, BMI 40-49.9 (morbid obesity) (CMS/[facility])    Asthma with acute exacerbation    Bronchitis       Past Medical History:   Diagnosis Date    Acute upper respiratory infection, unspecified 06/06/2022    Acute URI    Acute upper respiratory infection, unspecified 11/11/2019    Viral URI    Allergic     Asthma     Disease of thyroid gland     Encounter for routine child health examination with abnormal findings 07/29/2021    Encounter for well adolescent visit with abnormal findings    Encounter for routine child health examination with abnormal findings 05/24/2019    Encounter for routine child health examination with abnormal findings    Gastritis, acute 06/24/2023    GERD (gastroesophageal reflux disease)     Personal history of diseases of the skin and subcutaneous tissue 11/05/2016    History of contact dermatitis    Personal history of other diseases of the respiratory system     History of acute pharyngitis    Personal history of other diseases of the respiratory system 07/31/2019    History of viral pharyngitis     Personal history of other diseases of the respiratory system 05/24/2019    History of allergic rhinitis    Personal history of other drug therapy 11/05/2016    History of influenza vaccination    Personal history of other endocrine, nutritional and metabolic disease     History of hypothyroidism    Personal history of other specified conditions 12/10/2020    History of wheezing    Pharyngitis 06/24/2023      Past Surgical History:   Procedure Laterality Date    FRACTURE SURGERY  November 2021      Family History   Problem Relation Name Age of Onset    Asthma Mother Lisa Costa     Arthritis Father Ranjan Costa     Diabetes Father Ranjan Costa     Asthma Sister Lisa Costa     Diabetes Sister Cha Costa       Social History     Tobacco Use    Smoking status: Never     Passive exposure: Never    Smokeless tobacco: Never   Substance Use Topics    Alcohol use: Yes     Alcohol/week: 1.0 standard drink of alcohol     Types: 1 Standard drinks or equivalent per week     Comment: I drink very little, and mostly at special occasions        Current Outpatient Medications:     albuterol 90 mcg/actuation inhaler, Inhale 2 puffs every 6 hours if needed for wheezing or shortness of breath., Disp: 18 g, Rfl: 2    cetirizine (ZyrTEC) 10 mg tablet, Take 1 tablet (10 mg) by mouth once daily., Disp: , Rfl:     cyclobenzaprine (Flexeril) 10 mg tablet, Take 1 tablet (10 mg) by mouth 3 times a day as needed for muscle spasms., Disp: 30 tablet, Rfl: 0    escitalopram (Lexapro) 10 mg tablet, Take 1 tablet (10 mg) by mouth once daily., Disp: 90 tablet, Rfl: 1    L norgest/e.estradioL-e.estrad (Seasonique) 0.15 mg-30 mcg (84)/10 mcg (7) tablets,dose pack,3 month tablet, Take 1 tablet by mouth once daily., Disp: 91 tablet, Rfl: 3    levothyroxine (Synthroid, Levoxyl) 100 mcg tablet, Take 1 tablet (100 mcg) by mouth once daily in the morning. Take before meals., Disp: 90 tablet, Rfl: 1    semaglutide, weight loss, (Wegovy)  "0.25 mg/0.5 mL pen injector, Inject 0.25 mg under the skin 1 (one) time per week., Disp: 2 mL, Rfl: 1    topiramate (Topamax) 25 mg tablet, Take 1 tablet (25 mg) by mouth once daily at bedtime for 30 days, THEN 2 tablets (50 mg) once daily at bedtime., Disp: 90 tablet, Rfl: 0     Review of Systems  Constitutional: Denies fever  HEENT: Denies ST, earache  CVS: Denies Chest pain  Pulmonary: Denies wheezing, SOB  GI: Denies N/V  : Denies dysuria  Musculoskeletal:  Denies myalgia  Neuro: Denies focal weakness or numbness.  Skin: Denies Rashes.  *Review of Systems is negative unless otherwise mentioned in HPI or ROS above.    Objective   /78   Pulse 102   Temp 37 °C (98.6 °F)   Ht 1.676 m (5' 6\")   Wt 126 kg (277 lb)   SpO2 98%   BMI 44.71 kg/m²  reviewed Body mass index is 44.71 kg/m².     Physical Exam  Constitutional: NAD.  Resting comfortably.  Head: Atraumatic, normocephalic.  ENT: Moist oral mucosa. Nasal mucosa wnl.   Cardiac: Regular rate & rhythm.   Pulmonary: Lungs clear bilat  GI: Soft, Nontender, nondistended.   Musculoskeletal: No peripheral edema.   Skin: No evidence of trauma. No rashes  Psych: Intact judgement and insight.    .Assessment/Plan   Problem List Items Addressed This Visit    None  Visit Diagnoses         Codes    Morbid obesity with body mass index (BMI) of 40.0 or higher (CMS/[facility])    -  Primary E66.01    Relevant Medications    topiramate (Topamax) 25 mg tablet    Tic disorder     F95.9            "

## 2024-04-02 ENCOUNTER — OFFICE VISIT (OUTPATIENT)
Dept: PRIMARY CARE | Facility: CLINIC | Age: 24
End: 2024-04-02
Payer: COMMERCIAL

## 2024-04-02 VITALS
HEART RATE: 102 BPM | DIASTOLIC BLOOD PRESSURE: 78 MMHG | SYSTOLIC BLOOD PRESSURE: 126 MMHG | BODY MASS INDEX: 44.52 KG/M2 | TEMPERATURE: 98.6 F | OXYGEN SATURATION: 98 % | WEIGHT: 277 LBS | HEIGHT: 66 IN

## 2024-04-02 DIAGNOSIS — F95.9 TIC DISORDER: ICD-10-CM

## 2024-04-02 DIAGNOSIS — E66.01 MORBID OBESITY WITH BODY MASS INDEX (BMI) OF 40.0 OR HIGHER (MULTI): Primary | ICD-10-CM

## 2024-04-02 PROCEDURE — 99214 OFFICE O/P EST MOD 30 MIN: CPT | Performed by: PHYSICIAN ASSISTANT

## 2024-04-02 PROCEDURE — 3008F BODY MASS INDEX DOCD: CPT | Performed by: PHYSICIAN ASSISTANT

## 2024-04-02 PROCEDURE — 1036F TOBACCO NON-USER: CPT | Performed by: PHYSICIAN ASSISTANT

## 2024-04-02 RX ORDER — TOPIRAMATE 25 MG/1
TABLET ORAL
Qty: 90 TABLET | Refills: 0 | Status: SHIPPED | OUTPATIENT
Start: 2024-04-02 | End: 2024-04-30

## 2024-04-30 DIAGNOSIS — E66.01 MORBID OBESITY WITH BODY MASS INDEX (BMI) OF 40.0 OR HIGHER (MULTI): ICD-10-CM

## 2024-04-30 RX ORDER — TOPIRAMATE 25 MG/1
50 TABLET ORAL DAILY
Qty: 60 TABLET | Refills: 0 | Status: SHIPPED | OUTPATIENT
Start: 2024-04-30 | End: 2024-06-05 | Stop reason: SDUPTHER

## 2024-05-20 ENCOUNTER — APPOINTMENT (OUTPATIENT)
Dept: NEUROLOGY | Facility: HOSPITAL | Age: 24
End: 2024-05-20
Payer: COMMERCIAL

## 2024-06-04 ENCOUNTER — APPOINTMENT (OUTPATIENT)
Dept: NEUROLOGY | Facility: HOSPITAL | Age: 24
End: 2024-06-04
Payer: COMMERCIAL

## 2024-06-05 ENCOUNTER — HOSPITAL ENCOUNTER (OUTPATIENT)
Dept: RADIOLOGY | Facility: HOSPITAL | Age: 24
Discharge: HOME | End: 2024-06-05
Payer: COMMERCIAL

## 2024-06-05 ENCOUNTER — OFFICE VISIT (OUTPATIENT)
Dept: PRIMARY CARE | Facility: CLINIC | Age: 24
End: 2024-06-05
Payer: COMMERCIAL

## 2024-06-05 VITALS
DIASTOLIC BLOOD PRESSURE: 74 MMHG | BODY MASS INDEX: 45.32 KG/M2 | HEART RATE: 85 BPM | HEIGHT: 66 IN | TEMPERATURE: 96 F | WEIGHT: 282 LBS | SYSTOLIC BLOOD PRESSURE: 128 MMHG | OXYGEN SATURATION: 98 %

## 2024-06-05 DIAGNOSIS — E66.01 MORBID OBESITY WITH BODY MASS INDEX (BMI) OF 40.0 OR HIGHER (MULTI): ICD-10-CM

## 2024-06-05 DIAGNOSIS — F95.9 TIC DISORDER: Primary | ICD-10-CM

## 2024-06-05 DIAGNOSIS — E55.9 VITAMIN D INSUFFICIENCY: ICD-10-CM

## 2024-06-05 DIAGNOSIS — K21.9 GASTROESOPHAGEAL REFLUX DISEASE WITHOUT ESOPHAGITIS: ICD-10-CM

## 2024-06-05 DIAGNOSIS — E78.5 BORDERLINE HYPERLIPIDEMIA: ICD-10-CM

## 2024-06-05 PROCEDURE — 1036F TOBACCO NON-USER: CPT | Performed by: PHYSICIAN ASSISTANT

## 2024-06-05 PROCEDURE — 99214 OFFICE O/P EST MOD 30 MIN: CPT | Performed by: PHYSICIAN ASSISTANT

## 2024-06-05 PROCEDURE — 3008F BODY MASS INDEX DOCD: CPT | Performed by: PHYSICIAN ASSISTANT

## 2024-06-05 PROCEDURE — 71046 X-RAY EXAM CHEST 2 VIEWS: CPT

## 2024-06-05 PROCEDURE — 71046 X-RAY EXAM CHEST 2 VIEWS: CPT | Performed by: RADIOLOGY

## 2024-06-05 RX ORDER — FAMOTIDINE 40 MG/1
40 TABLET, FILM COATED ORAL NIGHTLY
Qty: 90 TABLET | Refills: 0 | Status: SHIPPED | OUTPATIENT
Start: 2024-06-05 | End: 2024-09-03

## 2024-06-05 RX ORDER — ESCITALOPRAM OXALATE 10 MG/1
10 TABLET ORAL DAILY
Qty: 90 TABLET | Refills: 3 | Status: SHIPPED | OUTPATIENT
Start: 2024-06-05

## 2024-06-05 RX ORDER — TOPIRAMATE 100 MG/1
100 TABLET, FILM COATED ORAL NIGHTLY
Qty: 90 TABLET | Refills: 3 | Status: SHIPPED | OUTPATIENT
Start: 2024-06-05

## 2024-06-05 ASSESSMENT — PATIENT HEALTH QUESTIONNAIRE - PHQ9
2. FEELING DOWN, DEPRESSED OR HOPELESS: NOT AT ALL
1. LITTLE INTEREST OR PLEASURE IN DOING THINGS: NOT AT ALL
SUM OF ALL RESPONSES TO PHQ9 QUESTIONS 1 AND 2: 0

## 2024-06-05 NOTE — PROGRESS NOTES
Subjective     HPI   Tianna Costa is a 23 y.o. year old female patient with presenting to clinic with concern for   Chief Complaint   Patient presents with    Follow-up     Wegovy not in stock, wants increase in topamax           Wegovy is covered by insurance, but she is unable to access this medication.     Interested in trying topamax for weight loss. Discussed side effects. Continue OCP.   Could consider adding wellbutrin at next visit also. Topamax may help her tourette's tics also.     Accepted at [facility]. Requests work excuse from lifting in humid heat at [employer] this summer. Sent papers. Going in the autumn through February. I am in agreement for her d/t wheezing issues. Staying out of [employer] for her asthma.     Tianna has a history of asthma, hypothyroidism, insulin resistance.       Patient Active Problem List   Diagnosis    Abnormal weight gain    Acanthosis nigricans    Allergic rhinitis    Altered gait    Hypothyroidism    Insulin resistance    Obesity, Class III, BMI 40-49.9 (morbid obesity) (Multi)    Asthma with acute exacerbation ([facility])    Bronchitis       Past Medical History:   Diagnosis Date    Acute upper respiratory infection, unspecified 06/06/2022    Acute URI    Acute upper respiratory infection, unspecified 11/11/2019    Viral URI    Allergic     Asthma ([facility])     Disease of thyroid gland     Encounter for routine child health examination with abnormal findings 07/29/2021    Encounter for well adolescent visit with abnormal findings    Encounter for routine child health examination with abnormal findings 05/24/2019    Encounter for routine child health examination with abnormal findings    Gastritis, acute 06/24/2023    GERD (gastroesophageal reflux disease)     Personal history of diseases of the skin and subcutaneous tissue 11/05/2016    History of contact dermatitis    Personal history of other diseases of the respiratory system     History of acute pharyngitis     Personal history of other diseases of the respiratory system 07/31/2019    History of viral pharyngitis    Personal history of other diseases of the respiratory system 05/24/2019    History of allergic rhinitis    Personal history of other drug therapy 11/05/2016    History of influenza vaccination    Personal history of other endocrine, nutritional and metabolic disease     History of hypothyroidism    Personal history of other specified conditions 12/10/2020    History of wheezing    Pharyngitis 06/24/2023      Past Surgical History:   Procedure Laterality Date    FRACTURE SURGERY  November 2021      Family History   Problem Relation Name Age of Onset    Asthma Mother Lisa Costa     Arthritis Father Ranjan Costa     Diabetes Father Ranjan Costa     Asthma Sister Lisa Costa     Diabetes Sister Cha Costa       Social History     Tobacco Use    Smoking status: Never     Passive exposure: Never    Smokeless tobacco: Never   Substance Use Topics    Alcohol use: Yes     Alcohol/week: 1.0 standard drink of alcohol     Types: 1 Standard drinks or equivalent per week     Comment: I drink very little, and mostly at special occasions        Current Outpatient Medications:     cetirizine (ZyrTEC) 10 mg tablet, Take 1 tablet (10 mg) by mouth once daily., Disp: , Rfl:     L norgest/e.estradioL-e.estrad (Seasonique) 0.15 mg-30 mcg (84)/10 mcg (7) tablets,dose pack,3 month tablet, Take 1 tablet by mouth once daily., Disp: 91 tablet, Rfl: 3    levothyroxine (Synthroid, Levoxyl) 100 mcg tablet, Take 1 tablet (100 mcg) by mouth once daily in the morning. Take before meals., Disp: 90 tablet, Rfl: 1    albuterol 90 mcg/actuation inhaler, Inhale 2 puffs every 6 hours if needed for wheezing or shortness of breath., Disp: 18 g, Rfl: 2    escitalopram (Lexapro) 10 mg tablet, Take 1 tablet (10 mg) by mouth once daily., Disp: 90 tablet, Rfl: 3    famotidine (Pepcid) 40 mg tablet, Take 1 tablet (40 mg) by mouth once daily  "at bedtime., Disp: 90 tablet, Rfl: 0    topiramate (Topamax) 100 mg tablet, Take 1 tablet (100 mg) by mouth at 3:00 in the morning., Disp: 90 tablet, Rfl: 3     Review of Systems  Constitutional: Denies fever  HEENT: Denies ST, earache  CVS: Denies Chest pain  Pulmonary: Denies wheezing, SOB  GI: Denies N/V  : Denies dysuria  Musculoskeletal:  Denies myalgia  Neuro: Denies focal weakness or numbness.  Skin: Denies Rashes.  *Review of Systems is negative unless otherwise mentioned in HPI or ROS above.    Objective   /74   Pulse 85   Temp 35.6 °C (96 °F)   Ht 1.676 m (5' 6\")   Wt 128 kg (282 lb)   SpO2 98%   BMI 45.52 kg/m²  reviewed Body mass index is 45.52 kg/m².     Physical Exam  Constitutional: NAD.  Resting comfortably.  Head: Atraumatic, normocephalic.  ENT: Moist oral mucosa. Nasal mucosa wnl.   Cardiac: Regular rate & rhythm.   Pulmonary: Lungs clear bilat  GI: Soft, Nontender, nondistended.   Musculoskeletal: No peripheral edema.   Skin: No evidence of trauma. No rashes  Psych: Intact judgement and insight.    .Assessment/Plan   Problem List Items Addressed This Visit    None  Visit Diagnoses         Codes    Tic disorder    -  Primary F95.9    Relevant Medications    escitalopram (Lexapro) 10 mg tablet    Morbid obesity with body mass index (BMI) of 40.0 or higher (Multi)     E66.01    Relevant Medications    topiramate (Topamax) 100 mg tablet    famotidine (Pepcid) 40 mg tablet    Borderline hyperlipidemia     E78.5    Relevant Orders    CBC    Comprehensive Metabolic Panel    TSH with reflex to Free T4 if abnormal    Lipid Panel    Vitamin D insufficiency     E55.9    Relevant Orders    Vitamin D 25-Hydroxy,Total (for eval of Vitamin D levels)    Gastroesophageal reflux disease without esophagitis     K21.9    Relevant Medications    famotidine (Pepcid) 40 mg tablet    Other Relevant Orders    XR chest 2 views            "

## 2024-07-05 DIAGNOSIS — N92.6 IRREGULAR PERIODS: ICD-10-CM

## 2024-07-05 RX ORDER — LEVONORGESTREL / ETHINYL ESTRADIOL AND ETHINYL ESTRADIOL 150-30(84)
1 KIT ORAL DAILY
Qty: 91 TABLET | Refills: 3 | Status: SHIPPED | OUTPATIENT
Start: 2024-07-05 | End: 2025-07-05

## 2024-08-01 ENCOUNTER — LAB (OUTPATIENT)
Dept: LAB | Facility: LAB | Age: 24
End: 2024-08-01
Payer: COMMERCIAL

## 2024-08-01 ENCOUNTER — APPOINTMENT (OUTPATIENT)
Dept: NEUROLOGY | Facility: HOSPITAL | Age: 24
End: 2024-08-01
Payer: COMMERCIAL

## 2024-08-01 DIAGNOSIS — E78.5 BORDERLINE HYPERLIPIDEMIA: ICD-10-CM

## 2024-08-01 DIAGNOSIS — E55.9 VITAMIN D INSUFFICIENCY: ICD-10-CM

## 2024-08-01 LAB
ALBUMIN SERPL BCP-MCNC: 3.8 G/DL (ref 3.4–5)
ALP SERPL-CCNC: 47 U/L (ref 33–110)
ALT SERPL W P-5'-P-CCNC: 14 U/L (ref 7–45)
ANION GAP SERPL CALC-SCNC: 13 MMOL/L (ref 10–20)
AST SERPL W P-5'-P-CCNC: 13 U/L (ref 9–39)
BILIRUB SERPL-MCNC: 0.6 MG/DL (ref 0–1.2)
BUN SERPL-MCNC: 15 MG/DL (ref 6–23)
CALCIUM SERPL-MCNC: 8.1 MG/DL (ref 8.6–10.3)
CHLORIDE SERPL-SCNC: 108 MMOL/L (ref 98–107)
CHOLEST SERPL-MCNC: 172 MG/DL (ref 0–199)
CHOLESTEROL/HDL RATIO: 4.1
CO2 SERPL-SCNC: 20 MMOL/L (ref 21–32)
CREAT SERPL-MCNC: 0.91 MG/DL (ref 0.5–1.05)
EGFRCR SERPLBLD CKD-EPI 2021: >90 ML/MIN/1.73M*2
ERYTHROCYTE [DISTWIDTH] IN BLOOD BY AUTOMATED COUNT: 12.7 % (ref 11.5–14.5)
GLUCOSE SERPL-MCNC: 82 MG/DL (ref 74–99)
HCT VFR BLD AUTO: 38.8 % (ref 36–46)
HDLC SERPL-MCNC: 42.2 MG/DL
HGB BLD-MCNC: 12.7 G/DL (ref 12–16)
LDLC SERPL CALC-MCNC: 114 MG/DL
MCH RBC QN AUTO: 28.1 PG (ref 26–34)
MCHC RBC AUTO-ENTMCNC: 32.7 G/DL (ref 32–36)
MCV RBC AUTO: 86 FL (ref 80–100)
NON HDL CHOLESTEROL: 130 MG/DL (ref 0–149)
NRBC BLD-RTO: 0 /100 WBCS (ref 0–0)
PLATELET # BLD AUTO: 355 X10*3/UL (ref 150–450)
POTASSIUM SERPL-SCNC: 4 MMOL/L (ref 3.5–5.3)
PROT SERPL-MCNC: 6.3 G/DL (ref 6.4–8.2)
RBC # BLD AUTO: 4.52 X10*6/UL (ref 4–5.2)
SODIUM SERPL-SCNC: 137 MMOL/L (ref 136–145)
TRIGL SERPL-MCNC: 81 MG/DL (ref 0–149)
TSH SERPL-ACNC: 1 MIU/L (ref 0.44–3.98)
VLDL: 16 MG/DL (ref 0–40)
WBC # BLD AUTO: 7.4 X10*3/UL (ref 4.4–11.3)

## 2024-08-01 PROCEDURE — 85027 COMPLETE CBC AUTOMATED: CPT

## 2024-08-01 PROCEDURE — 36415 COLL VENOUS BLD VENIPUNCTURE: CPT

## 2024-08-01 PROCEDURE — 80061 LIPID PANEL: CPT

## 2024-08-01 PROCEDURE — 84443 ASSAY THYROID STIM HORMONE: CPT

## 2024-08-01 PROCEDURE — 82306 VITAMIN D 25 HYDROXY: CPT

## 2024-08-01 PROCEDURE — 80053 COMPREHEN METABOLIC PANEL: CPT

## 2024-08-02 LAB — 25(OH)D3 SERPL-MCNC: 16 NG/ML (ref 30–100)

## 2024-08-05 DIAGNOSIS — E55.9 VITAMIN D DEFICIENCY: Primary | ICD-10-CM

## 2024-08-05 RX ORDER — ASPIRIN 325 MG
50000 TABLET, DELAYED RELEASE (ENTERIC COATED) ORAL
Qty: 6 CAPSULE | Refills: 0 | Status: SHIPPED | OUTPATIENT
Start: 2024-08-05 | End: 2024-09-16

## 2024-08-20 DIAGNOSIS — E66.01 MORBID OBESITY WITH BMI OF 40.0-44.9, ADULT (MULTI): ICD-10-CM

## 2024-08-20 DIAGNOSIS — E66.01 MORBID OBESITY WITH BMI OF 40.0-44.9, ADULT (MULTI): Primary | ICD-10-CM

## 2024-08-20 RX ORDER — SEMAGLUTIDE 0.25 MG/.5ML
0.25 INJECTION, SOLUTION SUBCUTANEOUS
Qty: 2 ML | Refills: 0 | Status: SHIPPED | OUTPATIENT
Start: 2024-08-25 | End: 2024-08-20 | Stop reason: SDUPTHER

## 2024-08-20 RX ORDER — SEMAGLUTIDE 0.25 MG/.5ML
0.25 INJECTION, SOLUTION SUBCUTANEOUS
Qty: 2 ML | Refills: 0 | Status: SHIPPED | OUTPATIENT
Start: 2024-08-20 | End: 2024-09-16

## 2024-08-26 DIAGNOSIS — K21.9 GASTROESOPHAGEAL REFLUX DISEASE WITHOUT ESOPHAGITIS: ICD-10-CM

## 2024-08-26 DIAGNOSIS — E66.01 MORBID OBESITY WITH BODY MASS INDEX (BMI) OF 40.0 OR HIGHER (MULTI): ICD-10-CM

## 2024-08-26 RX ORDER — FAMOTIDINE 40 MG/1
40 TABLET, FILM COATED ORAL NIGHTLY
Qty: 90 TABLET | Refills: 3 | Status: SHIPPED | OUTPATIENT
Start: 2024-08-26

## 2024-09-16 DIAGNOSIS — E66.01 MORBID OBESITY WITH BODY MASS INDEX (BMI) OF 40.0 OR HIGHER (MULTI): Primary | ICD-10-CM

## 2024-09-16 RX ORDER — SEMAGLUTIDE 0.5 MG/.5ML
0.5 INJECTION, SOLUTION SUBCUTANEOUS WEEKLY
Qty: 2 ML | Refills: 1 | Status: SHIPPED | OUTPATIENT
Start: 2024-09-16 | End: 2024-11-15

## 2024-10-07 DIAGNOSIS — E03.9 HYPOTHYROIDISM, UNSPECIFIED TYPE: ICD-10-CM

## 2024-10-07 RX ORDER — LEVOTHYROXINE SODIUM 100 UG/1
100 TABLET ORAL
Qty: 90 TABLET | Refills: 0 | Status: SHIPPED | OUTPATIENT
Start: 2024-10-07

## 2024-10-27 ENCOUNTER — PATIENT MESSAGE (OUTPATIENT)
Dept: PRIMARY CARE | Facility: CLINIC | Age: 24
End: 2024-10-27
Payer: COMMERCIAL

## 2024-10-27 DIAGNOSIS — E66.01 MORBID OBESITY WITH BODY MASS INDEX (BMI) OF 40.0 OR HIGHER (MULTI): Primary | ICD-10-CM

## 2024-10-28 RX ORDER — SEMAGLUTIDE 1 MG/.5ML
1 INJECTION, SOLUTION SUBCUTANEOUS WEEKLY
Qty: 2 ML | Refills: 0 | Status: SHIPPED | OUTPATIENT
Start: 2024-10-28 | End: 2024-10-28 | Stop reason: SDUPTHER

## 2024-10-28 RX ORDER — SEMAGLUTIDE 1 MG/.5ML
1 INJECTION, SOLUTION SUBCUTANEOUS WEEKLY
Qty: 6 ML | Refills: 0 | Status: SHIPPED | OUTPATIENT
Start: 2024-10-28 | End: 2025-01-14

## 2024-11-30 DIAGNOSIS — E03.9 HYPOTHYROIDISM, UNSPECIFIED TYPE: ICD-10-CM

## 2024-12-02 RX ORDER — LEVOTHYROXINE SODIUM 100 UG/1
100 TABLET ORAL
Qty: 90 TABLET | Refills: 0 | Status: SHIPPED | OUTPATIENT
Start: 2024-12-02

## 2025-02-03 DIAGNOSIS — N92.6 IRREGULAR PERIODS: ICD-10-CM

## 2025-02-03 DIAGNOSIS — E66.01 MORBID OBESITY WITH BODY MASS INDEX (BMI) OF 40.0 OR HIGHER (MULTI): ICD-10-CM

## 2025-02-03 RX ORDER — LEVONORGESTREL / ETHINYL ESTRADIOL AND ETHINYL ESTRADIOL 150-30(84)
1 KIT ORAL DAILY
Qty: 91 TABLET | Refills: 3 | Status: SHIPPED | OUTPATIENT
Start: 2025-02-03 | End: 2026-02-03

## 2025-02-03 RX ORDER — LEVONORGESTREL / ETHINYL ESTRADIOL AND ETHINYL ESTRADIOL 150-30(84)
1 KIT ORAL DAILY
Qty: 91 TABLET | Refills: 0 | OUTPATIENT
Start: 2025-02-03 | End: 2026-02-03

## 2025-02-03 RX ORDER — SEMAGLUTIDE 1 MG/.5ML
1 INJECTION, SOLUTION SUBCUTANEOUS WEEKLY
Qty: 6 ML | Refills: 0 | Status: SHIPPED | OUTPATIENT
Start: 2025-02-03 | End: 2025-04-22

## 2025-02-28 DIAGNOSIS — F95.9 TIC DISORDER: ICD-10-CM

## 2025-03-03 RX ORDER — ESCITALOPRAM OXALATE 10 MG/1
10 TABLET ORAL DAILY
Qty: 90 TABLET | Refills: 3 | Status: SHIPPED | OUTPATIENT
Start: 2025-03-03

## 2025-03-31 DIAGNOSIS — E66.01 MORBID OBESITY WITH BMI OF 40.0-44.9, ADULT (MULTI): Primary | ICD-10-CM

## 2025-06-04 ENCOUNTER — APPOINTMENT (OUTPATIENT)
Dept: PRIMARY CARE | Facility: CLINIC | Age: 25
End: 2025-06-04
Payer: COMMERCIAL

## 2025-06-04 VITALS
BODY MASS INDEX: 35.77 KG/M2 | HEART RATE: 91 BPM | HEIGHT: 66 IN | DIASTOLIC BLOOD PRESSURE: 79 MMHG | OXYGEN SATURATION: 99 % | SYSTOLIC BLOOD PRESSURE: 111 MMHG | WEIGHT: 222.6 LBS | TEMPERATURE: 97.9 F

## 2025-06-04 DIAGNOSIS — E66.9 OBESITY (BMI 30-39.9): Primary | ICD-10-CM

## 2025-06-04 DIAGNOSIS — E53.8 B12 DEFICIENCY: ICD-10-CM

## 2025-06-04 DIAGNOSIS — E78.5 BORDERLINE HYPERLIPIDEMIA: ICD-10-CM

## 2025-06-04 DIAGNOSIS — E55.9 VITAMIN D INSUFFICIENCY: ICD-10-CM

## 2025-06-04 DIAGNOSIS — E03.9 HYPOTHYROIDISM, UNSPECIFIED TYPE: ICD-10-CM

## 2025-06-04 DIAGNOSIS — K21.9 GASTROESOPHAGEAL REFLUX DISEASE WITHOUT ESOPHAGITIS: ICD-10-CM

## 2025-06-04 PROCEDURE — 99214 OFFICE O/P EST MOD 30 MIN: CPT | Performed by: PHYSICIAN ASSISTANT

## 2025-06-04 PROCEDURE — 3008F BODY MASS INDEX DOCD: CPT | Performed by: PHYSICIAN ASSISTANT

## 2025-06-04 PROCEDURE — 1036F TOBACCO NON-USER: CPT | Performed by: PHYSICIAN ASSISTANT

## 2025-06-04 RX ORDER — TOPIRAMATE 25 MG/1
TABLET, FILM COATED ORAL
Qty: 90 TABLET | Refills: 0 | Status: SHIPPED | OUTPATIENT
Start: 2025-06-04 | End: 2025-08-03

## 2025-06-04 ASSESSMENT — PROMIS GLOBAL HEALTH SCALE
EMOTIONAL_PROBLEMS: SOMETIMES
RATE_QUALITY_OF_LIFE: EXCELLENT
RATE_PHYSICAL_HEALTH: VERY GOOD
RATE_MENTAL_HEALTH: VERY GOOD
RATE_AVERAGE_PAIN: 0
RATE_SOCIAL_SATISFACTION: EXCELLENT
RATE_AVERAGE_FATIGUE: MODERATE
CARRYOUT_PHYSICAL_ACTIVITIES: COMPLETELY
CARRYOUT_SOCIAL_ACTIVITIES: EXCELLENT
RATE_GENERAL_HEALTH: VERY GOOD

## 2025-06-04 ASSESSMENT — PATIENT HEALTH QUESTIONNAIRE - PHQ9
2. FEELING DOWN, DEPRESSED OR HOPELESS: NOT AT ALL
SUM OF ALL RESPONSES TO PHQ9 QUESTIONS 1 AND 2: 0
1. LITTLE INTEREST OR PLEASURE IN DOING THINGS: NOT AT ALL

## 2025-06-04 NOTE — PROGRESS NOTES
Subjective     HPI   Tianna Costa is a 24 y.o. year old female patient with presenting to clinic with concern for   Chief Complaint   Patient presents with    Annual Exam         BP Readings from Last 5 Encounters:   06/04/25 111/79   06/05/24 128/74   05/07/24 (!) 138/92   04/02/24 126/78   02/10/24 141/82        Lab Results   Component Value Date    LDLCALC 114 08/01/2024       The ASCVD Risk score (Luzmaria WILKS, et al., 2019) failed to calculate for the following reasons:    The 2019 ASCVD risk score is only valid for ages 40 to 79    Seasonal allergies  -zyrtec    GERD  -pepcid 40 qhs    Lab Results   Component Value Date    HGBA1C 5.1 06/06/2022       Hypothyroidism  -levothyroxine 100mcg qd  Lab Results   Component Value Date    TSH 1.00 08/01/2024     Contraception   -Seasonique    Tic disorder  -lexapro 10    Obesity  -topamax 100qhs wean off topamax d/t fatigue  -Wegovy  BMI Readings from Last 5 Encounters:   06/04/25 35.93 kg/m²   06/05/24 45.52 kg/m²   04/02/24 44.71 kg/m²   01/03/24 44.69 kg/m²   12/19/23 43.26 kg/m²     Wt Readings from Last 5 Encounters:   06/04/25 101 kg (222 lb 9.6 oz)   06/05/24 128 kg (282 lb)   04/02/24 126 kg (277 lb)   01/03/24 126 kg (276 lb 14.4 oz)   12/19/23 122 kg (268 lb)           Problem List[1]    Medical History[2]   Surgical History[3]   Family History[4]   Social History     Tobacco Use    Smoking status: Never     Passive exposure: Never    Smokeless tobacco: Never   Substance Use Topics    Alcohol use: Yes     Alcohol/week: 1.0 standard drink of alcohol     Types: 1 Standard drinks or equivalent per week     Comment: I drink very little, and mostly at special occasions      Current Medications[5]     Review of Systems  Constitutional: Denies fever  HEENT: Denies ST, earache  CVS: Denies Chest pain  Pulmonary: Denies wheezing, SOB  GI: Denies N/V  : Denies dysuria  Musculoskeletal:  Denies myalgia  Neuro: Denies focal weakness or numbness.  Skin: Denies  "Rashes.  *Review of Systems is negative unless otherwise mentioned in HPI or ROS above.    Objective   /79   Pulse 91   Temp 36.6 °C (97.9 °F)   Ht 1.676 m (5' 6\")   Wt 101 kg (222 lb 9.6 oz)   SpO2 99%   BMI 35.93 kg/m²  reviewed Body mass index is 35.93 kg/m².     Physical Exam  Constitutional: NAD.  Resting comfortably.  Head: Atraumatic, normocephalic.  ENT: Moist oral mucosa. Nasal mucosa wnl.   Cardiac: Regular rate & rhythm.   Pulmonary: Lungs clear bilat  GI: Soft, Nontender, nondistended.   Musculoskeletal: No peripheral edema.   Skin: No evidence of trauma. No rashes  Psych: Intact judgement and insight.    .Assessment/Plan   Problem List Items Addressed This Visit           ICD-10-CM    Hypothyroidism E03.9     Other Visit Diagnoses         Codes      Obesity (BMI 30-39.9)    -  Primary E66.9    Relevant Medications    topiramate (Topamax) 25 mg tablet      Gastroesophageal reflux disease without esophagitis     K21.9      Borderline hyperlipidemia     E78.5    Relevant Orders    CBC    Comprehensive Metabolic Panel    TSH with reflex to Free T4 if abnormal    Lipid Panel      Vitamin D insufficiency     E55.9    Relevant Orders    Vitamin D 25-Hydroxy,Total (for eval of Vitamin D levels)      B12 deficiency     E53.8    Relevant Orders    Vitamin B12                 [1]   Patient Active Problem List  Diagnosis    Abnormal weight gain    Acanthosis nigricans    Allergic rhinitis    Altered gait    Hypothyroidism    Insulin resistance    Obesity, Class III, BMI 40-49.9 (morbid obesity)    Asthma with acute exacerbation ([facility])    Bronchitis   [2]   Past Medical History:  Diagnosis Date    Acute upper respiratory infection, unspecified 06/06/2022    Acute URI    Acute upper respiratory infection, unspecified 11/11/2019    Viral URI    Allergic     Asthma     Disease of thyroid gland     Encounter for routine child health examination with abnormal findings 07/29/2021    Encounter for well " adolescent visit with abnormal findings    Encounter for routine child health examination with abnormal findings 05/24/2019    Encounter for routine child health examination with abnormal findings    Gastritis, acute 06/24/2023    GERD (gastroesophageal reflux disease)     Personal history of diseases of the skin and subcutaneous tissue 11/05/2016    History of contact dermatitis    Personal history of other diseases of the respiratory system     History of acute pharyngitis    Personal history of other diseases of the respiratory system 07/31/2019    History of viral pharyngitis    Personal history of other diseases of the respiratory system 05/24/2019    History of allergic rhinitis    Personal history of other drug therapy 11/05/2016    History of influenza vaccination    Personal history of other endocrine, nutritional and metabolic disease     History of hypothyroidism    Personal history of other specified conditions 12/10/2020    History of wheezing    Pharyngitis 06/24/2023   [3]   Past Surgical History:  Procedure Laterality Date    FRACTURE SURGERY  November 2021   [4]   Family History  Problem Relation Name Age of Onset    Asthma Mother Lisa Costa     Arthritis Father Ranjan Costa     Diabetes Father Ranjan Costa     Asthma Sister Lisa Costa     Diabetes Sister Cha Costa    [5]   Current Outpatient Medications:     albuterol 90 mcg/actuation inhaler, Inhale 2 puffs every 6 hours if needed for wheezing or shortness of breath., Disp: 18 g, Rfl: 2    cetirizine (ZyrTEC) 10 mg tablet, Take 1 tablet (10 mg) by mouth once daily., Disp: , Rfl:     escitalopram (Lexapro) 10 mg tablet, TAKE 1 TABLET BY MOUTH EVERY DAY, Disp: 90 tablet, Rfl: 3    famotidine (Pepcid) 40 mg tablet, Take 1 tablet (40 mg) by mouth once daily at bedtime., Disp: 90 tablet, Rfl: 3    L norgest/e.estradioL-e.estrad (Seasonique) 0.15 mg-30 mcg (84)/10 mcg (7) tablets,dose pack,3 month tablet, Take 1 tablet by mouth once  daily., Disp: 91 tablet, Rfl: 3    levothyroxine (Synthroid, Levoxyl) 100 mcg tablet, TAKE 1 TABLET (100 MCG) BY MOUTH ONCE DAILY IN THE MORNING. TAKE BEFORE MEALS. PLEASE SCHEDULE APPOINTMENT FOR REFILLS, Disp: 90 tablet, Rfl: 0    semaglutide, weight loss, 1.7 mg/0.75 mL pen injector, Inject 1.7 mg under the skin every 7 days., Disp: 9 mL, Rfl: 0    topiramate (Topamax) 25 mg tablet, Take 2 tablets (50 mg) by mouth at 3:00 in the morning for 30 days, THEN 1 tablet (25 mg) at 3:00 in the morning., Disp: 90 tablet, Rfl: 0

## 2025-07-01 ENCOUNTER — PATIENT MESSAGE (OUTPATIENT)
Dept: PRIMARY CARE | Facility: CLINIC | Age: 25
End: 2025-07-01
Payer: COMMERCIAL

## 2025-07-01 DIAGNOSIS — E66.9 OBESITY (BMI 30-39.9): ICD-10-CM

## 2025-07-01 RX ORDER — TOPIRAMATE 25 MG/1
25 TABLET, FILM COATED ORAL NIGHTLY
Qty: 90 TABLET | Refills: 1 | Status: SHIPPED | OUTPATIENT
Start: 2025-07-01

## 2025-07-01 RX ORDER — TOPIRAMATE 25 MG/1
TABLET, FILM COATED ORAL
Qty: 60 TABLET | Refills: 1 | OUTPATIENT
Start: 2025-07-01

## 2025-07-01 RX ORDER — TOPIRAMATE 25 MG/1
TABLET, FILM COATED ORAL
Qty: 90 TABLET | Refills: 0 | OUTPATIENT
Start: 2025-07-01 | End: 2025-08-30

## 2025-07-13 LAB
25(OH)D3+25(OH)D2 SERPL-MCNC: 27 NG/ML (ref 30–100)
ALBUMIN SERPL-MCNC: 4.1 G/DL (ref 3.6–5.1)
ALP SERPL-CCNC: 51 U/L (ref 31–125)
ALT SERPL-CCNC: 12 U/L (ref 6–29)
ANION GAP SERPL CALCULATED.4IONS-SCNC: 7 MMOL/L (CALC) (ref 7–17)
AST SERPL-CCNC: 10 U/L (ref 10–30)
BILIRUB SERPL-MCNC: 0.6 MG/DL (ref 0.2–1.2)
BUN SERPL-MCNC: 12 MG/DL (ref 7–25)
CALCIUM SERPL-MCNC: 8.7 MG/DL (ref 8.6–10.2)
CHLORIDE SERPL-SCNC: 108 MMOL/L (ref 98–110)
CHOLEST SERPL-MCNC: 178 MG/DL
CHOLEST/HDLC SERPL: 4.2 (CALC)
CO2 SERPL-SCNC: 23 MMOL/L (ref 20–32)
CREAT SERPL-MCNC: 0.89 MG/DL (ref 0.5–0.96)
EGFRCR SERPLBLD CKD-EPI 2021: 93 ML/MIN/1.73M2
ERYTHROCYTE [DISTWIDTH] IN BLOOD BY AUTOMATED COUNT: 12.6 % (ref 11–15)
GLUCOSE SERPL-MCNC: 80 MG/DL (ref 65–99)
HCT VFR BLD AUTO: 40.6 % (ref 35–45)
HDLC SERPL-MCNC: 42 MG/DL
HGB BLD-MCNC: 12.9 G/DL (ref 11.7–15.5)
LDLC SERPL CALC-MCNC: 119 MG/DL (CALC)
MCH RBC QN AUTO: 28.5 PG (ref 27–33)
MCHC RBC AUTO-ENTMCNC: 31.8 G/DL (ref 32–36)
MCV RBC AUTO: 89.6 FL (ref 80–100)
NONHDLC SERPL-MCNC: 136 MG/DL (CALC)
PLATELET # BLD AUTO: 339 THOUSAND/UL (ref 140–400)
PMV BLD REES-ECKER: 9.4 FL (ref 7.5–12.5)
POTASSIUM SERPL-SCNC: 4.6 MMOL/L (ref 3.5–5.3)
PROT SERPL-MCNC: 6.8 G/DL (ref 6.1–8.1)
RBC # BLD AUTO: 4.53 MILLION/UL (ref 3.8–5.1)
SODIUM SERPL-SCNC: 138 MMOL/L (ref 135–146)
TRIGL SERPL-MCNC: 76 MG/DL
TSH SERPL-ACNC: 1.49 MIU/L
VIT B12 SERPL-MCNC: 178 PG/ML (ref 200–1100)
WBC # BLD AUTO: 6.5 THOUSAND/UL (ref 3.8–10.8)

## 2025-07-14 DIAGNOSIS — E03.9 HYPOTHYROIDISM, UNSPECIFIED TYPE: ICD-10-CM

## 2025-07-14 RX ORDER — LEVOTHYROXINE SODIUM 100 UG/1
100 TABLET ORAL
Qty: 90 TABLET | Refills: 3 | Status: SHIPPED | OUTPATIENT
Start: 2025-07-14

## 2025-07-16 DIAGNOSIS — E66.01 MORBID OBESITY WITH BMI OF 40.0-44.9, ADULT (MULTI): ICD-10-CM

## 2025-07-16 RX ORDER — SEMAGLUTIDE 1.7 MG/.75ML
1.7 INJECTION, SOLUTION SUBCUTANEOUS
Qty: 9 ML | Refills: 0 | Status: SHIPPED | OUTPATIENT
Start: 2025-07-20

## 2025-08-12 DIAGNOSIS — E66.01 MORBID OBESITY WITH BMI OF 40.0-44.9, ADULT (MULTI): ICD-10-CM

## 2025-08-13 RX ORDER — SEMAGLUTIDE 1.7 MG/.75ML
1.7 INJECTION, SOLUTION SUBCUTANEOUS
Qty: 9 ML | Refills: 0 | Status: SHIPPED | OUTPATIENT
Start: 2025-08-17

## 2025-09-03 DIAGNOSIS — E66.01 MORBID OBESITY WITH BMI OF 40.0-44.9, ADULT (MULTI): ICD-10-CM

## 2025-09-03 RX ORDER — SEMAGLUTIDE 1.7 MG/.75ML
1.7 INJECTION, SOLUTION SUBCUTANEOUS
Qty: 9 ML | Refills: 0 | Status: SHIPPED | OUTPATIENT
Start: 2025-09-07

## 2025-12-09 ENCOUNTER — APPOINTMENT (OUTPATIENT)
Dept: PRIMARY CARE | Facility: CLINIC | Age: 25
End: 2025-12-09
Payer: COMMERCIAL